# Patient Record
(demographics unavailable — no encounter records)

---
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
## 2018-12-17 NOTE — RADIOLOGY REPORT (SQ)
EXAM DESCRIPTION:  NO CHG FLUORO



COMPLETE DATE/TIME:  12/17/2018 3:32 pm



REASON FOR STUDY:  ORIF LEFT HIP ASST WITH FLUORO IN OR



FINDINGS:  Please see combined report for performance of procedure and radiologic supervision and int
erpretation.



IMPRESSION:  Please see combined report for performance of procedure and radiologic supervision and i
nterpretation.



Reading location - IP/workstation name: [email or web address]

## 2018-12-17 NOTE — EKG REPORT
SEVERITY:- ABNORMAL ECG -

SINUS RHYTHM

ST ELEVATION, CONSIDER EARLY REPOLARISATION CHANGES

:

Confirmed by: Ivania Lugo MD 17-Dec-2018 00:56:21

## 2018-12-17 NOTE — OPERATIVE REPORT
Operative Report


DATE OF SURGERY: 12/17/18


PREOPERATIVE DIAGNOSIS: Left intratrochanteric femur fracture


OPERATION: Open reduction internal fixation left intratrochanteric femur 

fracture


SURGEON: RODRIGUEZ DILLARD


ANESTHESIA: Spinal


ESTIMATED BLOOD LOSS: 100


PROCEDURE: 





With the patient supine on the fracture table the left lower extremity 

hindquarter prepped and draped in a sterile fashion.  Under fluoroscopic 

guidance a pin is placed percutaneously down through the greater trochanter 

into the proximal femoral metadiaphysis.  A combined reamer was then used to 

fashion a cortical opening.  These are removed and a ball-tipped guide abdoul was 

placed down the femur.  Femoral length is measured to be 440 mm.  Subsequently 

a Dustin gamma 3 nail, 125 degrees x 11 mm x 420 mm is advanced over the ball-

tipped guide abdoul for approximate for a proximal interlock that is central in 

the femoral neck.  Subsequently 95 mm proximal interlock is placed.  A single 

47.5 mm distal interlock is placed.  The wounds irrigated.  The wounds are 

closed using Vicryl followed by an staples.  A sterile compressive dressing is 

applied and patient's return to PACU in satisfactory condition.

## 2018-12-17 NOTE — RADIOLOGY REPORT (SQ)
EXAM DESCRIPTION:  HIP IN OPERATING RM



COMPLETED DATE/TIME:  12/17/2018 3:32 pm



REASON FOR STUDY:  ORIF LEFT HIP ASST WITH FLUORO IN OR



COMPARISON:  None.



FLUOROSCOPY TIME:  0.7 minutes

4 images saved to PACS.



TECHNIQUE:  Intra-operative images acquired during surgical procedure to evaluate progress.

NUMBER OF IMAGES: 4 images



LIMITATIONS:  None.



FINDINGS:  Fluoroscopic images were obtained during open reduction and internal fixation of the left 
hip.  Orthopedic nail and intramedullary abdoul are identified in position.  Please refer to the surgeon
's operative report for additional information.



IMPRESSION:  IMAGE(S) OBTAINED DURING PROCEDURE.



COMMENT:  Quality :  Final reports for procedures using fluoroscopy that document radiation exp
osure indices, or exposure time and number of fluorographic images (if radiation exposure indices are
 not available)

Please consult full operative report of the attending physician for description of the procedure.



TECHNICAL DOCUMENTATION:  JOB ID:  [id number]

 2011 [employer]- All Rights Reserved



Reading location - IP/workstation name: [email or web address]

## 2018-12-17 NOTE — PDOC PROGRESS REPORT
Subjective


Progress Note for:: 12/17/18


Reason For Visit: 


FRACTURE OF HIP


Left intratrochanteric femur fracture





Physical Exam


Vital Signs: 


 











Temp Pulse Resp BP Pulse Ox


 


 37.1 C   49 L  17   123/84   100 


 


 12/16/18 23:31  12/16/18 23:31  12/16/18 23:31  12/16/18 23:31  12/16/18 23:31








 Intake & Output











 12/16/18 12/17/18 12/18/18





 06:59 06:59 06:59


 


Intake Total 0 2989 


 


Output Total 0 900 


 


Balance 0 2089 


 


Weight 68 kg 65.6 kg 











General appearance: PRESENT: mild distress, thin


Head exam: PRESENT: normocephalic


Respiratory exam: PRESENT: unlabored


Cardiovascular exam: PRESENT: RRR


Pulses: PRESENT: +1 pedal pulses bilateral


Vascular exam: PRESENT: normal capillary refill


GI/Abdominal exam: PRESENT: soft


Rectal exam: PRESENT: deferred


Musculoskeletal exam: PRESENT: other - Neurovascular examination of the left 

foot is intact.


Neurological exam: PRESENT: alert, awake, oriented to person, oriented to place

, oriented to time, oriented to situation.  ABSENT: motor sensory deficit


Psychiatric exam: PRESENT: appropriate affect, normal mood.  ABSENT: homicidal 

ideation, suicidal ideation


Skin exam: PRESENT: dry, intact, warm.  ABSENT: cyanosis, rash





Results


Impressions: 


 





Ribs w/Chest X-Ray  12/16/18 00:00


IMPRESSION:


 


No acute left rib fracture. Osteopenia. Remote fractures of the


posterior left sixth and seventh ribs.


 


Lungs are hyperinflated but clear


 


 


copyright 2011 [employer]- All Rights Reserved


 








Hip X-Ray  12/16/18 00:20


IMPRESSION:


 


Comminuted, mildly displaced and angulated intertrochanteric


fracture of the left hip


 


 


copyright 2011 [employer]- All Rights Reserved


 








Femur X-Ray  12/16/18 00:58


IMPRESSION:


 


Intertrochanteric fracture deformity. Osteopenia


 


 


copyright 2011 [employer]- All Rights Reserved


 








Lumbar Spine CT  12/16/18 00:58


IMPRESSION:


 


Minor degenerative change, at L4-5 and L5-S1.


 


TECHNICAL DOCUMENTATION:


 


Quality ID # 436: Final reports with documentation of one or more


dose reduction techniques (e.g., Automated exposure control,


adjustment of the mA and/or kV according to patient size, use of


iterative reconstruction technique)


 


copyright 2011 [employer]- All Rights Reserved


 











Status: Imported from PACS





Assessment & Plan





- Diagnosis


(1) Intertrochanteric fracture of left femur


Qualifiers: 


   Encounter type: initial encounter   Fracture type: closed   Fracture 

alignment: displaced   Qualified Code(s): S72.142A - Displaced 

intertrochanteric fracture of left femur, initial encounter for closed fracture

   


Is this a current diagnosis for this admission?: Yes   


Plan: 


Plan for an open reduction internal fixation under choice anesthesia today.








- Time


Time Spent with patient: 15-24 minutes


Anticipated discharge: Home with Homehealth


Within: within 24 hours

## 2018-12-18 NOTE — PDOC PROGRESS REPORT
Subjective


Progress Note for:: 12/18/18


Reason For Visit: 


FRACTURE OF HIP


58-year-old black male postop day 1 status post open reduction internal 

fixation of the left intratrochanteric femur fracture.  Overnight issues 

included behavioral issues and potential withdrawal symptoms.  Patient's Ativan 

was doubled.





Physical Exam


Vital Signs: 


 











Temp Pulse Resp BP Pulse Ox


 


 37.4 C   81   17   141/96 H  100 


 


 12/18/18 00:02  12/18/18 00:02  12/18/18 00:02  12/18/18 00:02  12/18/18 00:02








 Intake & Output











 12/17/18 12/18/18 12/19/18





 06:59 06:59 06:59


 


Intake Total 2989 2250 


 


Output Total 900 1320 


 


Balance 2089 930 


 


Weight 65.6 kg 60 kg 











General appearance: PRESENT: disheveled, mild distress, thin


Head exam: PRESENT: normocephalic


Respiratory exam: PRESENT: unlabored


Cardiovascular exam: PRESENT: RRR


Pulses: PRESENT: +1 pedal pulses bilateral


Vascular exam: PRESENT: normal capillary refill


GI/Abdominal exam: PRESENT: soft


Rectal exam: PRESENT: deferred


Extremities exam: PRESENT: other - Left proximal hip dressing is changed this 

morning.  Wounds are well approximated without ongoing drainage.  Leg lengths 

are equal.  Distal neurovascular examination is intact.


Neurological exam: PRESENT: awake.  ABSENT: motor sensory deficit


Psychiatric exam: PRESENT: agitated, unusual affect


Skin exam: PRESENT: dry, intact, warm.  ABSENT: cyanosis, rash





Results


Laboratory Results: 


 





 12/18/18 07:06 





 











  12/18/18





  07:06


 


WBC  8.2


 


RBC  3.31 L


 


Hgb  10.7 L


 


Hct  30.8 L


 


MCV  93


 


MCH  32.5


 


MCHC  34.9


 


RDW  12.7


 


Plt Count  188











Impressions: 


 





Ribs w/Chest X-Ray  12/16/18 00:00


IMPRESSION:


 


No acute left rib fracture. Osteopenia. Remote fractures of the


posterior left sixth and seventh ribs.


 


Lungs are hyperinflated but clear


 


 


copyright 2011 [employer]- All Rights Reserved


 








Femur X-Ray  12/16/18 00:58


IMPRESSION:


 


Intertrochanteric fracture deformity. Osteopenia


 


 


copyright 2011 [employer]- All Rights Reserved


 








Lumbar Spine CT  12/16/18 00:58


IMPRESSION:


 


Minor degenerative change, at L4-5 and L5-S1.


 


TECHNICAL DOCUMENTATION:


 


Quality ID # 436: Final reports with documentation of one or more


dose reduction techniques (e.g., Automated exposure control,


adjustment of the mA and/or kV according to patient size, use of


iterative reconstruction technique)


 


copyright 2011 [employer]- All Rights Reserved


 








Fluoroscopy  12/17/18 00:00


IMPRESSION:  Please see combined report for performance of procedure and 

radiologic supervision and interpretation.


 








Hip X-Ray  12/17/18 00:00


IMPRESSION:  IMAGE(S) OBTAINED DURING PROCEDURE.


 











Status: Imported from PACS





Assessment & Plan





- Diagnosis


(1) Intertrochanteric fracture of left femur


Qualifiers: 


   Encounter type: initial encounter   Fracture type: closed   Fracture 

alignment: displaced   Qualified Code(s): S72.142A - Displaced 

intertrochanteric fracture of left femur, initial encounter for closed fracture

   


Is this a current diagnosis for this admission?: Yes   


Plan: 


58-year-old black male now postop day 1 status post ORIF of the left 

intratrochanteric femur fracture with potentially superimposed withdrawal 

symptoms.  Plan will be to mobilize with physical therapy and weightbearing as 

tolerated basis.  Continue withdrawal prophylaxis with Ativan.








- Time


Time Spent with patient: 15-24 minutes


Anticipated discharge: Home with Homehealth


Within: Other

## 2018-12-19 NOTE — PDOC PROGRESS REPORT
Subjective


Progress Note for:: 12/19/18


Reason For Visit: 


FRACTURE OF HIP








Physical Exam


Vital Signs: 


                                        











Temp Pulse Resp BP Pulse Ox


 


 37.1 C   68   17   138/83 H  100 


 


 12/18/18 23:27  12/18/18 23:27  12/18/18 23:27  12/18/18 23:27  12/18/18 23:27








                                 Intake & Output











 12/18/18 12/19/18 12/20/18





 06:59 06:59 06:59


 


Intake Total 3273 4123 


 


Output Total 1320 1395 


 


Balance 1953 2728 


 


Weight 60 kg 59.5 kg 











Physical Exam: 





Patient with continued mental status changes.


Respiratory exam: PRESENT: unlabored


Cardiovascular exam: PRESENT: RRR


Extremities exam: PRESENT: other - Left lower extremity dressing clean dry and 

intact.  Leg lengths are equal.





Results


Laboratory Results: 


                                        





                                 12/18/18 07:06 





                                 12/18/18 07:06 





                                        











  12/18/18 12/18/18





  07:06 07:06


 


WBC  8.2 


 


RBC  3.31 L 


 


Hgb  10.7 L 


 


Hct  30.8 L 


 


MCV  93 


 


MCH  32.5 


 


MCHC  34.9 


 


RDW  12.7 


 


Plt Count  188 


 


Sodium   135.1 L


 


Potassium   3.8


 


Chloride   104


 


Carbon Dioxide   25


 


Anion Gap   6


 


BUN   5 L


 


Creatinine   0.61


 


Est GFR ( Amer)   > 60


 


Est GFR (Non-Af Amer)   > 60


 


Glucose   108


 


Calcium   8.9











Impressions: 


                                        





Ribs w/Chest X-Ray  12/16/18 00:00


IMPRESSION:


 


No acute left rib fracture. Osteopenia. Remote fractures of the


posterior left sixth and seventh ribs.


 


Lungs are hyperinflated but clear


 


 


copyright 2011 [employer]- All Rights Reserved


 








Femur X-Ray  12/16/18 00:58


IMPRESSION:


 


Intertrochanteric fracture deformity. Osteopenia


 


 


copyright 2011 [employer]- All Rights Reserved


 








Lumbar Spine CT  12/16/18 00:58


IMPRESSION:


 


Minor degenerative change, at L4-5 and L5-S1.


 


TECHNICAL DOCUMENTATION:


 


Quality ID # 436: Final reports with documentation of one or more


dose reduction techniques (e.g., Automated exposure control,


adjustment of the mA and/or kV according to patient size, use of


iterative reconstruction technique)


 


copyright 2011 [employer]- All Rights Reserved


 








Fluoroscopy  12/17/18 00:00


IMPRESSION:  Please see combined report for performance of procedure and 

radiologic supervision and interpretation.


 








Hip X-Ray  12/17/18 00:00


IMPRESSION:  IMAGE(S) OBTAINED DURING PROCEDURE.


 











Status: Imported from PACS





Assessment & Plan





- Diagnosis


(1) Intertrochanteric fracture of left femur


Qualifiers: 


   Encounter type: initial encounter   Fracture type: closed   Fracture 

alignment: displaced   Qualified Code(s): S72.142A - Displaced intertrochanteric

fracture of left femur, initial encounter for closed fracture   


Is this a current diagnosis for this admission?: Yes   


Plan: 


Mobilize with physical therapy and weightbearing as tolerated basis as mental 

status permits.








- Time


Time Spent with patient: 15-24 minutes


Anticipated discharge: Other


Within: Other

## 2018-12-20 NOTE — PDOC PROGRESS REPORT
Subjective


Progress Note for:: 12/20/18


Reason For Visit: 


FRACTURE OF HIP


58-year-old black male now postop day 3 status post open reduction internal 

fixation of a left proximal femur fracture.  Postoperative course has been 

complicated by mental status changes consistent with alcohol withdrawal.





Physical Exam


Vital Signs: 


                                        











Temp Pulse Resp BP Pulse Ox


 


 37.1 C   64   20   115/64   96 


 


 12/20/18 00:18  12/20/18 00:18  12/19/18 21:00  12/20/18 00:18  12/20/18 00:18








                                 Intake & Output











 12/19/18 12/20/18 12/21/18





 06:59 06:59 06:59


 


Intake Total 4123 3259 


 


Output Total 1395 1250 


 


Balance 2728 2009 


 


Weight 59.5 kg 58.8 kg 











General appearance: PRESENT: no acute distress


Respiratory exam: PRESENT: unlabored


Cardiovascular exam: PRESENT: RRR


Pulses: PRESENT: +1 pedal pulses bilateral


Extremities exam: PRESENT: other - Left lower extremity dressings clean dry and 

intact.  Leg lengths are equal.





Results


Laboratory Results: 


                                        





                                 12/18/18 07:06 





                                 12/18/18 07:06 








Impressions: 


                                        





Ribs w/Chest X-Ray  12/16/18 00:00


IMPRESSION:


 


No acute left rib fracture. Osteopenia. Remote fractures of the


posterior left sixth and seventh ribs.


 


Lungs are hyperinflated but clear


 


 


copyright 2011 [employer]- All Rights Reserved


 








Femur X-Ray  12/16/18 00:58


IMPRESSION:


 


Intertrochanteric fracture deformity. Osteopenia


 


 


copyright 2011 [employer]- All Rights Reserved


 








Lumbar Spine CT  12/16/18 00:58


IMPRESSION:


 


Minor degenerative change, at L4-5 and L5-S1.


 


TECHNICAL DOCUMENTATION:


 


Quality ID # 436: Final reports with documentation of one or more


dose reduction techniques (e.g., Automated exposure control,


adjustment of the mA and/or kV according to patient size, use of


iterative reconstruction technique)


 


copyright 2011 [employer]- All Rights Reserved


 








Fluoroscopy  12/17/18 00:00


IMPRESSION:  Please see combined report for performance of procedure and 

radiologic supervision and interpretation.


 








Hip X-Ray  12/17/18 00:00


IMPRESSION:  IMAGE(S) OBTAINED DURING PROCEDURE.


 











Status: Imported from PACS





Assessment & Plan





- Diagnosis


(1) Intertrochanteric fracture of left femur


Qualifiers: 


   Encounter type: initial encounter   Fracture type: closed   Fracture 

alignment: displaced   Qualified Code(s): S72.142A - Displaced intertrochanteric

fracture of left femur, initial encounter for closed fracture   


Is this a current diagnosis for this admission?: Yes   


Plan: 


Limited progress with physical therapy.  No clear improvement in his overall men

reji status at this point.








- Time


Time Spent with patient: 15-24 minutes


Anticipated discharge: Home with Homehealth


Within: Other
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
## 2024-11-15 NOTE — RADIOLOGY REPORT (SQ)
"It was good to see you today. Thank you for coming in.    When using opioids for pain control, constipation is common and patients should act to prevent it:  Drink PLENTY of water. This is important to keep the gut moving.  Some people have success w/ using prunes, prune juice, certain fruits or vegetables (apples, bananas, prunes, pears, raspberries, and vegetables like string beans, broccoli, spinach, kale, squash, lentils, peas, and beans), or fiber gummies.  Try a probiotic. This could be yogurt or kefir, or fermented beverages such as kombucha, but probiotics are also available in capsule form. Aim for 10-15 billion colony-forming-units, w/ bacteria such as Lactobacillus / Saccharomyces / Actinomyces.  Osmotic laxatives (Miralax, magnesium citrate, Milk of Magnesia) can be very useful for opioid-induced constipation (OIC); take daily to prevent OIC.  Bulk laxatives (Citrucel, Metamucil, Fibercon, Benefiber, wheat germ) are useful for constipation in patients who are not taking opioids, but are not recommended if you are taking opioids.  Colace is good for softening hard stools, or preventing constipation when opioids are being used - but does not stimulate the bowel to move things along once constipation has occurred.  You can use senna, 1 to 2 tabs, once or twice daily as needed for constipation. Use as directed on the box/bottle. Senna is also available in a tea (\"Smooth Move\"). Should that not be enough for your constipation, you can try Dulcolax.  Should that not be enough, consider an enema.  All of these medications are available over-the-counter.  Return in about 3 months (around 2/15/2025).  Call us for refills on medications that we supply, as needed.   If something changes and you need to come in sooner, please call our office.    PRESCRIPTION REFILL REMINDER:  All medication refills should be requested prior to Noon on Friday. Any refill requests after noon on Friday would be addressed the following " EXAM DESCRIPTION: 



XR HIP 2 OR MORE VIEWS



COMPLETED DATE/TME:  12/16/2018 00:20



CLINICAL HISTORY: 



58 years, Male, INJURY



COMPARISON:

None.



NUMBER OF VIEWS:

2



TECHNIQUE:

AP pelvis and single view left hip



LIMITATIONS:

None.



FINDINGS:



Comminuted mildly displaced intertrochanteric fracture deformity

of the left hip. No dislocation. Mild varus angulation.



IMPRESSION:



Comminuted, mildly displaced and angulated intertrochanteric

fracture of the left hip

 



copyright 2011 [employer]- All Rights Reserved Monday.    MEDICATION SAFETY ISSUES:   Do not drive under the influence of narcotics (including opioids), watch for adverse effects including confusion / altered mental status / respiratory depression (slowed breathing), keep medications stored in a safe/locked environment, do not use alcohol while opioids or other narcotics are in your system. Do not travel with more than the minimum number of tablets or capsules required for the trip.